# Patient Record
Sex: MALE | Race: BLACK OR AFRICAN AMERICAN | Employment: FULL TIME | URBAN - METROPOLITAN AREA
[De-identification: names, ages, dates, MRNs, and addresses within clinical notes are randomized per-mention and may not be internally consistent; named-entity substitution may affect disease eponyms.]

---

## 2017-01-17 ENCOUNTER — HOSPITAL ENCOUNTER (OUTPATIENT)
Dept: PHYSICAL THERAPY | Age: 57
Discharge: HOME | End: 2017-01-31

## 2017-01-17 ENCOUNTER — HOSPITAL ENCOUNTER (OUTPATIENT)
Dept: OTHER | Age: 57
Discharge: HOME | End: 2017-01-17
Attending: ORTHOPAEDIC SURGERY | Admitting: ORTHOPAEDIC SURGERY

## 2017-01-19 ENCOUNTER — HOSPITAL ENCOUNTER (OUTPATIENT)
Dept: PHYSICAL THERAPY | Age: 57
Discharge: HOME | End: 2017-01-19

## 2017-01-24 ENCOUNTER — HOSPITAL ENCOUNTER (OUTPATIENT)
Dept: PHYSICAL THERAPY | Age: 57
Discharge: HOME | End: 2017-01-24

## 2017-01-26 ENCOUNTER — HOSPITAL ENCOUNTER (OUTPATIENT)
Dept: PHYSICAL THERAPY | Age: 57
Discharge: HOME | End: 2017-01-26

## 2017-01-31 ENCOUNTER — HOSPITAL ENCOUNTER (OUTPATIENT)
Dept: PHYSICAL THERAPY | Age: 57
Discharge: HOME | End: 2017-01-31

## 2017-02-02 ENCOUNTER — HOSPITAL ENCOUNTER (OUTPATIENT)
Dept: PHYSICAL THERAPY | Age: 57
Discharge: HOME | End: 2017-02-02

## 2017-02-09 ENCOUNTER — HOSPITAL ENCOUNTER (OUTPATIENT)
Dept: PHYSICAL THERAPY | Age: 57
Discharge: HOME | End: 2017-02-09

## 2018-03-31 PROBLEM — K62.5 RECTAL BLEED: Status: ACTIVE | Noted: 2018-03-31

## 2020-03-17 ENCOUNTER — HOSPITAL ENCOUNTER (OUTPATIENT)
Dept: PHYSICAL THERAPY | Age: 60
Setting detail: THERAPIES SERIES
Discharge: HOME | End: 2020-03-17
Payer: COMMERCIAL

## 2020-03-17 PROCEDURE — 97161 PT EVAL LOW COMPLEX 20 MIN: CPT | Performed by: CHIROPRACTOR

## 2020-03-17 PROCEDURE — 97530 THERAPEUTIC ACTIVITIES: CPT | Performed by: CHIROPRACTOR

## 2020-03-17 PROCEDURE — 97113 AQUATIC THERAPY/EXERCISES: CPT

## 2020-03-17 ASSESSMENT — PAIN SCALES - QUEBEC BACK PAIN DISABILITY SCALE
TURN OVER IN BED: 1
RUN ONE BLOCK OR 100M: 2
GET OUT OF BED: 1
BEND OVER TO CLEAN THE BATHTUB: 2
PUT ON SOCKS OR PANYHOSE: 1
RIDE IN A CAR: 0
CLIMB ONE FLIGHT OF STAIRS: 0
LIFT AND CARRY A HEAVY SUITCASE: 2
MAKE YOUR BED: 1
PULL OR PUSH HEAVY DOORS: 1
SIT IN A CHAIR FOR SEVERAL HOURS: 1
SLEEP THROUGH THE NIGHT: 1
TAKE FOOD OUT OF THE REFRIGERATOR: 1
REACH UP TO HIGH SHELVES: 1
MOVE A CHAIR: 1
QUEBEC CMS MODIFIER: CI
WALK A FEW BLOCKS OR 300 TO 400M: 1
THROW A BALL: 1
QUEBEC DISABILITY INDEX: 1-19%
WALK SEVERAL KILOMETERS  OR MILES: 0
CARRY TWO BAGS OF GROCERIES: 1
TOTAL SCORE: 19
STAND UP FOR 20 TO 30 MINUTES: 0

## 2020-03-17 NOTE — FLOWSHEET NOTE
1: Decrease pain 50-75%  Long term goal 2: Resume usual ADLs     Patient Requires Follow-up: [x] Yes  [] No    Plan:   [x] Continue per plan of care [] Alter current plan (see comments)  [] Plan of care initiated [] Hold pending MD visit [] Discharge    Plan for Next Session:  [address]    Electronically signed by:  Altagracia Castro [id number]

## 2020-03-17 NOTE — FLOWSHEET NOTE
Physical Therapy Aquatic Flow Sheet   Date:  3/17/2020    Patient Name:  Karin Bonilla    :  1960    Restrictions/Precautions:  Fall Risk(No fall risk)  Pertinent Medical History: HTN, TKA    Diagnosis:  S/P lumbar fusion  Treatment Diagnosis:      Insurance/Certification information: Aetna Open Access  Referring Physician: Dr. Hadley Joshi of care signed (Y/N):      Visit# / total visits:    Pain level: 2/10      Progress Note: []  Yes  [x]  No      History of Injury:Lumbar fusion 20    Subjective:  No new c/o. Has been in the pool in the past.    Objective:   Observation:  See eval   Test measurements:      Key  B= Belt DB= Dumbells T= Theratube   G=Gloves N= Noodles W= Weights   P= Paddles WW=Water Walker K= Kickboard        Transfers:   steps with bilat handrails      % Immersion: 75%            Ambulation:   Exercises UE:      Forwards 3+1 Shoulder Shrugs     Lateral  Shoulder circles     Marching    Scapular Retraction  10    Retro   Rolling      Cariocas  Push Downs 10     IR/ER      Punching    Stretching: bilat Rowing 10   Gastroc/Soleus  20 sec x 3  Elbow Flex/Ext 10   Hamstring  20 sec x 3 Shldr Flex/Ext     Knee flex stretch   Shldr Abd/Add    Piriformis   Horiz Abd/Add     Hip flexor    Arm Circles  10   SKTC   PNF Diagonals    DKTC  UE oscillations f/b, s/s    ITB   Wall Push-ups    Quad  Figure 8's    Mid back   Buoy pull/push downs    UT  Tband rows    Rhom  Tband lats    Post Shoulder  Lumbar Rot w/ paddles    Pec   Small ball pull downs                   diagonals             Cervical:       AROM Flex       AROM Extension     LEs exercises:  bilat AROM Side Bending    Marching  10 AROM Rotation    Heel Raises  10 Chin tucks    Toe Raises  10 Chin nods     Squats  10      Hamstring Curls  10      Hip Flexion  10 Balance:      Hip Abduction 10 SLS    Hip Circles  Tandem stance    TA set  NBOS eyes open    Glut Set  NBOS eyes closed    Hip Extension  Hand to Opposite Knee Hip Adduction    Box Step     Hip IR   Noodle Stance     Hip ER  Stop/Go Gait    Fig 8's  Switch Gait                Seated: bilat Functional:    Ankle Pumps  Step up forward    Ankle circles  Step up lateral    Knee flex & ext  Step down    Hip Abd & Adduction  [place] squats    Bicycle   Crate Lifts    Add Set with ball  Lunges forward    LX stab with med ball throws  Lunges lateral    Ankle INV  Lunges retro    Ankle EV  Lower ab curl with noodle      Upper ab curl with ball      Med ball straight lifts      Med ball diagonal lifts      Hydrorider          Noodle:      Leg Press  Deep Water:    Noodle hang at wall  Jog    Noodle hang deep water  Jumping Jacks    Noodle Bicycle  Heel to toe      Hand to opposite knee      Cross country skier      Rocking Horse    Verbal cues for proper posture, exercise technique and exercise without increase pain. Charges:  Individual Aquatic Therapy:  [x] (05940) Provided verbal and tactile cueing for strengthening, flexibility, ROM using the therapeutic properties of water (buoyancy, resistance) for improvements in core control, mobility and ambulation     Aquatic Group:  [] (23772) Provided intermittent verbal and tactile cueing for strengthening, endurance, flexibility and ROM for 2-4 individuals that do not require one-on one patient contact by the therapist     Individual Aquatic Minutes: 30   Aquatic Group Minutes:      [x] Aquatic (01823) x 2  [] Aquatic Group (07938) x    Treatment/Activity Tolerance:  [x] Patient tolerated treatment well [] Patient limited by fatique  [] Patient limited by pain  [] Patient limited by other medical complications  [] Other:     Pain after aquatics: 2/10  Patient Education: Discussed with pt importance of exercises without increase pain and with proper posture. Pt verbalized understanding.     Plan:   [x] Continue per plan of care [] Alter current plan (see comments)  [] Plan of care initiated [] Hold pending MD visit []

## 2020-03-17 NOTE — PROGRESS NOTES
Physical Therapy  Initial Assessment  Date: 3/17/2020  Patient Name: Jeremi Lowry  MRN: [id number]  : 1960          Restrictions  Restrictions/Precautions  Restrictions/Precautions: Fall Risk(No fall risk)    Subjective   General  Chart Reviewed: Yes  Patient assessed for rehabilitation services?: Yes  Additional Pertinent Hx: HTN, TKA  Family / Caregiver Present: No  Referring Practitioner: Dr. Laura Bergeron  Referral Date : 20  Diagnosis: S/P lumbar fusion  PT Visit Information  Onset Date: (Surgery 20)  PT Insurance Information: Aetna Open Access  Total # of Visits Approved: 12  Total # of Visits to Date: 1  Subjective  Subjective: S/p Lumbar fusion  Pain Screening  Patient Currently in Pain: (3/10 at rest, up to 7/10 with activity)    Objective     Observation/Palpation  Posture: Good  Body Mechanics: aware of proper body mechanics    Spine  Lumbar: Lumbar mobility not tested    Strength RLE  Strength RLE: WNL  Strength LLE  Strength LLE: WNL  Strength Other  Other: Trunk strength at least 3/5     Additional Measures  Flexibility: HS flexibility: 65 degrees B  Sensation  Overall Sensation Status: WFL             Ambulation  Ambulation?: (Amb well without any assistive device)                            Assessment   Conditions Requiring Skilled Therapeutic Intervention  Body structures, Functions, Activity limitations: Increased pain;Decreased ROM  Assessment: Prior level of function: Independent with all usual ADLs  Prognosis: Good  Decision Making: Low Complexity  REQUIRES PT FOLLOW UP: Yes  Activity Tolerance  Activity Tolerance: Patient Tolerated treatment well         Plan   Plan  Times per week: 2x/wk x 6 weeks  Current Treatment Recommendations: Strengthening, Home Exercise Program, Aquatics         OutComes Score  [place] Total Score: 19 (20 1255)                                                        Goals  Short term goals  Time Frame for Short term goals: 3 weeks  Short term goal 1:

## 2020-03-19 ENCOUNTER — HOSPITAL ENCOUNTER (OUTPATIENT)
Dept: PHYSICAL THERAPY | Age: 60
Setting detail: THERAPIES SERIES
Discharge: HOME | End: 2020-03-19
Payer: COMMERCIAL

## 2020-03-19 PROCEDURE — 97113 AQUATIC THERAPY/EXERCISES: CPT

## 2020-03-19 PROCEDURE — 97150 GROUP THERAPEUTIC PROCEDURES: CPT

## 2020-03-19 NOTE — FLOWSHEET NOTE
Physical Therapy Aquatic Flow Sheet   Date:  3/19/2020    Patient Name:  Eben Patricia    :  1960    Restrictions/Precautions:  Fall Risk(No fall risk)  Pertinent Medical History: HTN, TKA    Diagnosis:  S/P lumbar fusion  Treatment Diagnosis:      Insurance/Certification information: Aetna Open Access  Referring Physician: Dr. Prashant Boo of care signed (Y/N):      Visit# / total visits:    Pain level: 2/10      Progress Note: []  Yes  [x]  No      History of Injury:Lumbar fusion 20    Subjective:  Felt pretty food after initial session. Objective:   Observation:  See eval   Test measurements:      Key  B= Belt DB= Dumbells T= Theratube   G=Gloves N= Noodles W= Weights   P= Paddles WW=Water Walker K= Kickboard        Transfers:   steps with bilat handrails      % Immersion: 75%            Ambulation:   Exercises UE:      Forwards  4 Shoulder Shrugs     Lateral  Shoulder circles  10    Marching    Scapular Retraction  10    Retro   Rolling  10    Cariocas  Push Downs  10     IR/ER  10     Punching  10   Stretching: bilat Rowing  10   Gastroc/Soleus  20 sec x 3  Elbow Flex/Ext  10   Hamstring  20 sec x 3 Shldr Flex/Ext  10   Knee flex stretch   Shldr Abd/Add  10   Piriformis   Horiz Abd/Add  10   Hip flexor    Arm Circles  10   SKTC   PNF Diagonals  10   DKTC  UE oscillations f/b, s/s    ITB   Wall Push-ups    Quad  Figure 8's    Mid back   Buoy pull/push downs    UT  Tband rows    Rhom  Tband lats    Post Shoulder  Lumbar Rot w/ paddles    Pec   Small ball pull downs                   diagonals             Cervical:       AROM Flex       AROM Extension     LEs exercises:  bilat AROM Side Bending    Marching  10 AROM Rotation    Heel Raises  10 Chin tucks    Toe Raises  10 Chin nods     Squats  10      Hamstring Curls  10      Hip Flexion  10 Balance:      Hip Abduction  10 SLS    Hip Circles  10 Tandem stance    TA set  NBOS eyes open    Glut Set  10 NBOS eyes closed    Hip Extension initiated [] Hold pending MD visit [] Discharge    Plan for Next Session:  Gradually increase gait and exercise with an emphasis on posture, proper exercise tech, and no increase pain. Therapist will educate patient on lumbopelvic stabilization with exercise and ADL's. Add:  With emphasis on stabilization, good posture and exercise technique, gradually progress spinal stabilization exercises to increase strength, flexibility and endurance and to decrease pain and improve function.   Add: lateral gait    Electronically signed by: Teresa Plata, PTA [id number]

## 2020-03-23 NOTE — DISCHARGE SUMMARY
Physical Therapy Discharge Note  Date: 3/23/2020    Patient Name: Rubin Arevalo  : 1960  MRN: [id number]    Diagnosis:  S/P Lumbar fusion    Referring Physician: Dr. Evelyne Butt    Dates of Service:   3/17/20 - 3/19/20  Total # of Visits:   2  Cancel/No Shows to date:        Treatment to Date:  [] Therapeutic Exercise  [] Modalities:  [x] Therapeutic Activity     [] Ultrasound  [] Electrical Stim   [] Gait Training      [] Cervical Traction    [] Lumbar Traction  [] Neuromuscular Re-education  [] Cold/hotpack [] Iontophoresis  [x] Instruction in HEP      Other:  [] Manual Therapy       []    [x] Aquatic Therapy       []                            Significant Findings At Last Visit:    Subjective: Pain 2/10                       Did not get final measurements because clinic was shut down                Overall Response to Treatment:  [] Patient responded well to treatment and improvement is noted with regards to functional goals              [x]Patient should continue to improve in reasonable time if they continue HEP              []Patient has plateaued and is no longer responding to skilled PT intervention                        []Patient is getting worse and would benefit from return to referring MD              []Patient unable to adhere to initial POC              []Other:     Goals:    Goal Status:  [] Achieved [] Partially Achieved  [x] Not Achieved     Reason for Discharge:  [] Goals Met   [] Patient did not return after initial evaluation   [] Progress Plateaued [] Missed _____ scheduled appointments   [] No insurance coverage [] Patient terminated therapy   [x] Other:  DC due to facility closing for C-19 precautions       PT recommendation:   [x] Patient now discharged with HEP      [] Other:    Discharge discussed with:  [x] Patient  [] Caregiver       [] Other        Electronically signed by:  Leann Julian    If you have any questions or concerns, please don't hesitate to call.   Thank you for your referral.

## 2020-03-24 ENCOUNTER — APPOINTMENT (OUTPATIENT)
Dept: PHYSICAL THERAPY | Age: 60
End: 2020-03-24
Payer: COMMERCIAL

## 2020-03-26 ENCOUNTER — APPOINTMENT (OUTPATIENT)
Dept: PHYSICAL THERAPY | Age: 60
End: 2020-03-26
Payer: COMMERCIAL

## 2020-03-31 ENCOUNTER — APPOINTMENT (OUTPATIENT)
Dept: PHYSICAL THERAPY | Age: 60
End: 2020-03-31
Payer: COMMERCIAL

## 2023-01-04 ENCOUNTER — HOSPITAL ENCOUNTER (OUTPATIENT)
Dept: CT IMAGING | Age: 63
Discharge: HOME | End: 2023-01-04
Payer: COMMERCIAL

## 2023-01-04 DIAGNOSIS — N20.1 CALCULUS OF URETER: ICD-10-CM

## 2023-01-04 PROCEDURE — 74176 CT ABD & PELVIS W/O CONTRAST: CPT

## 2023-01-11 ENCOUNTER — APPOINTMENT (OUTPATIENT)
Dept: CT IMAGING | Age: 63
End: 2023-01-11
Payer: COMMERCIAL

## 2023-01-11 ENCOUNTER — HOSPITAL ENCOUNTER (EMERGENCY)
Age: 63
Discharge: HOME | End: 2023-01-11
Payer: COMMERCIAL

## 2023-01-11 VITALS
WEIGHT: 213.63 LBS | DIASTOLIC BLOOD PRESSURE: 92 MMHG | RESPIRATION RATE: 14 BRPM | HEIGHT: 70 IN | TEMPERATURE: 97.4 F | SYSTOLIC BLOOD PRESSURE: 148 MMHG | OXYGEN SATURATION: 100 % | HEART RATE: 70 BPM | BODY MASS INDEX: 30.58 KG/M2

## 2023-01-11 DIAGNOSIS — M51.36 DDD (DEGENERATIVE DISC DISEASE), LUMBAR: ICD-10-CM

## 2023-01-11 DIAGNOSIS — M54.50 LEFT-SIDED LOW BACK PAIN WITHOUT SCIATICA, UNSPECIFIED CHRONICITY: Primary | ICD-10-CM

## 2023-01-11 DIAGNOSIS — R10.9 LEFT FLANK PAIN: ICD-10-CM

## 2023-01-11 DIAGNOSIS — R91.1 PULMONARY NODULE: ICD-10-CM

## 2023-01-11 LAB
A/G RATIO: 1.6 (ref 1.1–2.2)
ALBUMIN SERPL-MCNC: 4.2 G/DL (ref 3.4–5)
ALP BLD-CCNC: 106 U/L (ref 40–129)
ALT SERPL-CCNC: 15 U/L (ref 10–40)
ANION GAP SERPL CALCULATED.3IONS-SCNC: 7 MMOL/L (ref 3–16)
AST SERPL-CCNC: 15 U/L (ref 15–37)
BASOPHILS ABSOLUTE: 0 K/UL (ref 0–0.2)
BASOPHILS RELATIVE PERCENT: 0.7 %
BILIRUB SERPL-MCNC: <0.2 MG/DL (ref 0–1)
BILIRUBIN URINE: NEGATIVE
BLOOD, URINE: NEGATIVE
BUN BLDV-MCNC: 15 MG/DL (ref 7–20)
CALCIUM SERPL-MCNC: 9.3 MG/DL (ref 8.3–10.6)
CHLORIDE BLD-SCNC: 104 MMOL/L (ref 99–110)
CLARITY: CLEAR
CO2: 27 MMOL/L (ref 21–32)
COLOR: YELLOW
CREAT SERPL-MCNC: 0.8 MG/DL (ref 0.8–1.3)
EOSINOPHILS ABSOLUTE: 0.2 K/UL (ref 0–0.6)
EOSINOPHILS RELATIVE PERCENT: 4.7 %
GFR SERPL CREATININE-BSD FRML MDRD: >60 ML/MIN/{1.73_M2}
GLUCOSE BLD-MCNC: 97 MG/DL (ref 70–99)
GLUCOSE URINE: NEGATIVE MG/DL
HCT VFR BLD CALC: 41.7 % (ref 40.5–52.5)
HEMOGLOBIN: 13.7 G/DL (ref 13.5–17.5)
KETONES, URINE: NEGATIVE MG/DL
LEUKOCYTE ESTERASE, URINE: NEGATIVE
LYMPHOCYTES ABSOLUTE: 1.5 K/UL (ref 1–5.1)
LYMPHOCYTES RELATIVE PERCENT: 37.9 %
MCH RBC QN AUTO: 29.8 PG (ref 26–34)
MCHC RBC AUTO-ENTMCNC: 32.9 G/DL (ref 31–36)
MCV RBC AUTO: 90.5 FL (ref 80–100)
MICROSCOPIC EXAMINATION: NORMAL
MONOCYTES ABSOLUTE: 0.6 K/UL (ref 0–1.3)
MONOCYTES RELATIVE PERCENT: 16 %
NEUTROPHILS ABSOLUTE: 1.6 K/UL (ref 1.7–7.7)
NEUTROPHILS RELATIVE PERCENT: 40.7 %
NITRITE, URINE: NEGATIVE
PDW BLD-RTO: 13.8 % (ref 12.4–15.4)
PH UA: 7.5 (ref 5–8)
PLATELET # BLD: 292 K/UL (ref 135–450)
PMV BLD AUTO: 7.2 FL (ref 5–10.5)
POTASSIUM REFLEX MAGNESIUM: 4.3 MMOL/L (ref 3.5–5.1)
PROTEIN UA: NEGATIVE MG/DL
RBC # BLD: 4.61 M/UL (ref 4.2–5.9)
SODIUM BLD-SCNC: 138 MMOL/L (ref 136–145)
SPECIFIC GRAVITY UA: 1.03 (ref 1–1.03)
TOTAL PROTEIN: 6.9 G/DL (ref 6.4–8.2)
URINE REFLEX TO CULTURE: NORMAL
URINE TYPE: NORMAL
UROBILINOGEN, URINE: 1 E.U./DL
WBC # BLD: 3.9 K/UL (ref 4–11)

## 2023-01-11 PROCEDURE — 99284 EMERGENCY DEPT VISIT MOD MDM: CPT

## 2023-01-11 PROCEDURE — 74176 CT ABD & PELVIS W/O CONTRAST: CPT

## 2023-01-11 PROCEDURE — 80053 COMPREHEN METABOLIC PANEL: CPT

## 2023-01-11 PROCEDURE — 81003 URINALYSIS AUTO W/O SCOPE: CPT

## 2023-01-11 PROCEDURE — 6360000002 HC RX W HCPCS: Performed by: NURSE PRACTITIONER

## 2023-01-11 PROCEDURE — 96375 TX/PRO/DX INJ NEW DRUG ADDON: CPT

## 2023-01-11 PROCEDURE — 96374 THER/PROPH/DIAG INJ IV PUSH: CPT

## 2023-01-11 PROCEDURE — 2580000003 HC RX 258: Performed by: NURSE PRACTITIONER

## 2023-01-11 PROCEDURE — 85025 COMPLETE CBC W/AUTO DIFF WBC: CPT

## 2023-01-11 RX ORDER — 0.9 % SODIUM CHLORIDE 0.9 %
1000 INTRAVENOUS SOLUTION INTRAVENOUS ONCE
Status: COMPLETED | OUTPATIENT
Start: 2023-01-11 | End: 2023-01-11

## 2023-01-11 RX ORDER — KETOROLAC TROMETHAMINE 30 MG/ML
15 INJECTION, SOLUTION INTRAMUSCULAR; INTRAVENOUS ONCE
Status: COMPLETED | OUTPATIENT
Start: 2023-01-11 | End: 2023-01-11

## 2023-01-11 RX ORDER — TRAMADOL HYDROCHLORIDE 50 MG/1
50 TABLET ORAL EVERY 6 HOURS PRN
Qty: 12 TABLET | Refills: 0 | Status: SHIPPED | OUTPATIENT
Start: 2023-01-11 | End: 2023-01-14

## 2023-01-11 RX ORDER — METHOCARBAMOL 500 MG/1
500 TABLET, FILM COATED ORAL 3 TIMES DAILY
Qty: 30 TABLET | Refills: 0 | Status: SHIPPED | OUTPATIENT
Start: 2023-01-11 | End: 2023-01-21

## 2023-01-11 RX ORDER — PREDNISONE 20 MG/1
TABLET ORAL
Qty: 18 TABLET | Refills: 0 | Status: SHIPPED | OUTPATIENT
Start: 2023-01-11 | End: 2023-01-21

## 2023-01-11 RX ADMIN — HYDROMORPHONE HYDROCHLORIDE 1 MG: 1 INJECTION, SOLUTION INTRAMUSCULAR; INTRAVENOUS; SUBCUTANEOUS at 18:40

## 2023-01-11 RX ADMIN — SODIUM CHLORIDE 1000 ML: 9 INJECTION, SOLUTION INTRAVENOUS at 18:25

## 2023-01-11 RX ADMIN — KETOROLAC TROMETHAMINE 15 MG: 30 INJECTION, SOLUTION INTRAMUSCULAR at 18:41

## 2023-01-11 ASSESSMENT — ENCOUNTER SYMPTOMS
BACK PAIN: 1
NAUSEA: 1
ABDOMINAL PAIN: 1
VOMITING: 0
RESPIRATORY NEGATIVE: 1

## 2023-01-11 ASSESSMENT — PAIN DESCRIPTION - ORIENTATION: ORIENTATION: LEFT

## 2023-01-11 ASSESSMENT — PAIN SCALES - GENERAL
PAINLEVEL_OUTOF10: 9
PAINLEVEL_OUTOF10: 9

## 2023-01-11 ASSESSMENT — PAIN DESCRIPTION - LOCATION: LOCATION: FLANK

## 2023-01-11 ASSESSMENT — PAIN - FUNCTIONAL ASSESSMENT: PAIN_FUNCTIONAL_ASSESSMENT: 0-10

## 2023-01-11 ASSESSMENT — PAIN DESCRIPTION - DESCRIPTORS: DESCRIPTORS: SHARP;STABBING;THROBBING

## 2023-01-11 NOTE — ED NOTES
Patient just arrived to ED 9     Report from Seun Mccall 82 states Labs and urine sent just needs medications     Sindi Young, RN  01/11/23 2507

## 2023-01-11 NOTE — ED PROVIDER NOTES
[address]  [address] [address]  Dept: [phone]  Loc: [phone]  eMERGENCYdEPARTMENT eNCOUnter      Pt Name: Angy Sharma  MRN: [id number]  Mikhailgflara 1960  Date of evaluation: 1/11/2023  Provider:ELIZABETH Arteaga - [facility]      Independently evaluated by the advanced practice provider    [address] [place] [address]       Chief Complaint   Patient presents with    Abdominal Pain     Pt reports LLQ abd pain and L flank pain since 01/01. Pt reports that he went to urgent care and given flomax. Pt has hx of kidney stones and feels like this. CRITICAL CARE TIME       HISTORY OF PRESENT ILLNESS  (Location/Symptom, Timing/Onset, Context/Setting, Quality, Duration,Modifying Factors, Severity.)   Angy Sharma is a 58 y.o. male who presents to the emergency department PMHx: Kidney stones, HTN  Surgical history includes appendectomy, lumbar fusion, left testicle removal    Lives at home with male significant other  Employed  Anticoagulation therapy: None  CODE STATUS: Full    HPI provided by patient    Patient presents to the emergency department complaining of a 2-week history of waxing and waning left lower quadrant abdominal pain, left flank and left back pain associated with hematuria. Started 2 weeks ago. Was seen at an urgent care and was given Flomax and antibiotics and was told he had a kidney stone. He also seen his primary care who obtained an outpatient CT abdomen and pelvis study which essentially was negative. .  The pain did improve and go away for a day or 2 but has come back since then and is extremely intense. Positive nausea and chills. Denies trauma. Denies testicular pain. Denies penile discharge. Pain level currently 10/10. Nothing makes it better, made worse with movement. Denies any kind of testicular pain or swelling.         Nursing Notes were reviewedand agreed with or any disagreements were addressed in the HPI.    REVIEW OF SYSTEMS    (2-9 systems for level 4, 10 or more for level 5)     Review of Systems   Constitutional:  Positive for activity change, appetite change and chills.   HENT: Negative.     Respiratory: Negative.     Cardiovascular: Negative.    Gastrointestinal:  Positive for abdominal pain and nausea. Negative for vomiting.   Genitourinary:  Positive for flank pain and hematuria.   Musculoskeletal:  Positive for back pain.   Neurological: Negative.    Hematological: Negative.      Except as noted above the remainder of the review of systems was reviewed and negative.       PAST MEDICAL HISTORY         Diagnosis Date    Hypertension     Kidney stone        SURGICAL HISTORY           Procedure Laterality Date    BACK SURGERY      CARPAL TUNNEL RELEASE  03/2018    ENDOSCOPY, COLON, DIAGNOSTIC  04/02/2018    JOINT REPLACEMENT      ROTATOR CUFF REPAIR Right        CURRENT MEDICATIONS     [unfilled]    ALLERGIES     Aspirin and Ketorolac    FAMILY HISTORY     History reviewed. No pertinent family history.  No family status information on file.        SOCIAL HISTORY      reports that he has been smoking cigarettes. He has never used smokeless tobacco. He reports current alcohol use. He reports that he does not currently use drugs after having used the following drugs: Marijuana (Weed).    PHYSICAL EXAM    (up to 7 for level 4, 8 or more for level 5)     ED Triage Vitals [01/11/23 1730]   Enc Vitals Group      BP (!) 151/86      Heart Rate 73      Resp 18      Temp 97.4 °F (36.3 °C)      Temp Source Oral      SpO2 97 %      Weight 213 lb 10 oz (96.9 kg)      Height 5' 10\" (1.778 m)      Head Circumference       Peak Flow       Pain Score       Pain Loc       Pain Edu?       Excl. in GC?         Physical Exam  Vitals and nursing note reviewed.   Constitutional:       Appearance: He is not toxic-appearing or diaphoretic.      Comments: Does not appear toxic but does appear uncomfortable and  distressed due to pain. No diaphoresis. HENT:      Head: Normocephalic. Cardiovascular:      Rate and Rhythm: Normal rate and regular rhythm. Pulmonary:      Effort: Pulmonary effort is normal.      Breath sounds: Normal breath sounds. Abdominal:      General: Abdomen is protuberant. Bowel sounds are normal. There is abdominal bruit. There are no signs of injury. Palpations: Abdomen is soft. Tenderness: There is abdominal tenderness in the left lower quadrant. There is left CVA tenderness. There is no guarding or rebound. Negative signs include Maurice's sign, Rovsing's sign and McBurney's sign. Skin:     General: Skin is warm and dry. Capillary Refill: Capillary refill takes less than 2 seconds. Neurological:      General: No focal deficit present. Mental Status: He is alert. DIAGNOSTIC RESULTS     EKG: All EKG's are interpreted by the Emergency Department Physician who either signs or Co-signs this chart in the absence of a cardiologist.    RADIOLOGY:   Non-plain film images such as CT, Ultrasound and MRI are read by the radiologist. Plain radiographic images are visualized and preliminarilyinterpreted by the emergency physician with the below findings:    Interpretation per the Radiologist below,if available at the time of this note:    CT ABDOMEN PELVIS WO CONTRAST Additional Contrast? None   Final Result   1. No acute findings in the abdomen or pelvis. No evidence of urinary tract   obstruction. 2.  Colonic diverticulosis without evidence of diverticulitis. 3.  Status post posterior spinal fusion without evidence of complication. There is however multilevel degenerative intervertebral facet arthropathy   within the lumbar spine.                LABS:  Labs Reviewed   CBC WITH AUTO DIFFERENTIAL - Abnormal; Notable for the following components:       Result Value    WBC 3.9 (*)     Neutrophils Absolute 1.6 (*)     All other components within normal limits COMPREHENSIVE METABOLIC PANEL W/ REFLEX TO MG FOR LOW K   URINALYSIS WITH REFLEX TO CULTURE       All other labs were within normal range or not returned as of this dictation. EMERGENCY DEPARTMENT COURSE and DIFFERENTIAL DIAGNOSIS/MDM:   Vitals:    Vitals:    01/11/23 1819 01/11/23 1834 01/11/23 2004 01/11/23 2007   BP: (!) 144/89 (!) 145/85 (!) 148/92    Pulse: 74 74 71 70   Resp: 22 25 22 14   Temp:       TempSrc:       SpO2: 100% 96% 99% 100%   Weight:       Height:         Medications   0.9 % sodium chloride bolus (0 mLs IntraVENous Stopped 1/11/23 2007)   HYDROmorphone (DILAUDID) injection 1 mg (1 mg IntraVENous Given 1/11/23 1840)   ketorolac (TORADOL) injection 15 mg (15 mg IntraVENous Given 1/11/23 1841)       MDM    Patient was seen and evaluated per myself. Dr. Chris Maxwell present available for consultation as needed. Differential diagnosis: Ureteral obstruction, hydroureter, pyelonephritis, diverticulitis, exacerbation of of back pain-> escalation of DDD or herniated disc  Less likely appendicitis due to the fact prior appendectomy    Vital signs: Afebrile 97.4, no tachycardia. No evidence of respiratory distress. Blood pressure mildly elevated 151/86. Patient does take antihypertensives. This is not consistent with hypertensive emergency or urgency. Plan: CT abdomen and pelvis without, urinalysis, basic laboratory studies, IV fluids, Dilaudid IV push and Toradol IV push    Bedside reevaluation after pain medication given: No left lower quadrant pain, no flank tenderness to palpation or percussion. There was some pain to the left paraspinous and in the SI region. No evidence of swelling. Patient's vital signs are stable. He self endorses improvement of pain and discomfort. Metabolic panel unremarkable. No electrolyte derangement. No acidosis. Renal function is unremarkable. CBC: Mild leukopenia 3.9. Absolute neutrophils low 1.6. Otherwise unremarkable.   CBC in 2018 indicates borderline low normal WBC of 4.3. Absolute neutrophils were 2.6. CT abdomen and pelvis negative for obstruction. No bowel abnormality other than mild diverticulosis. No evidence of diverticulitis or abscess. No free air or free fluid. He has bilateral inguinal hernias containing fat. No evidence of hydroureter or pyelonephritis. There is evidence of multilevel degenerative facet arthropathy with no evidence of hardware failure. SDM: All diagnostic study results were discussed with the patient and his partner at bedside. There is no evidence of UTI or hematuria. No evidence of ureteral obstruction. Vital signs are stable. No evidence of infection. At this point in time I feel that the patient can be discharged home with pain medication. I feel that he needs to refer back to his primary care physician for any further follow-up regarding an etiology related to his back. Patient naturally is instructed return to the emergency department for any return of symptoms or worsening of symptoms. He and his partner both were in agreement he is discharged home in good condition and ambulatory. Discharging prescriptions included prednisone, tramadol and Robaxin. CONSULTS:  None    PROCEDURES:  Procedures    FINAL IMPRESSION      1. Left-sided low back pain without sciatica, unspecified chronicity    2. Left flank pain    3. DDD (degenerative disc disease), lumbar    4.  Pulmonary nodule          DISPOSITION/PLAN   [unfilled]    PATIENT REFERRED TO:  Shae Nina MD  [address] [place] [address]  [phone]    Schedule an appointment as soon as possible for a visit       DISCHARGE MEDICATIONS:  Discharge Medication List as of 1/11/2023  8:08 PM        START taking these medications    Details   predniSONE (DELTASONE) 20 MG tablet 3 tabs po qam for 3 days then 2 tabs qam for 3 days the 1 tab qam for 3 days, Disp-18 tablet, R-0Print      traMADol (ULTRAM) 50 MG tablet Take 1 tablet by mouth every 6 hours as needed for Pain for up to 3 days. Intended supply: 3 days. Take lowest dose possible to manage pain Max Daily Amount: 200 mg, Disp-12 tablet, R-0Print      methocarbamol (ROBAXIN) 500 MG tablet Take 1 tablet by mouth 3 times daily for 10 days, Disp-30 tablet, R-0Print             (Please note that portions of this note were completed with a voice recognition program.  Efforts were made to edit the dictations but occasionally words are mis-transcribed.)    Halley Leonardo, [facility]IZABETH - [facility]      This dictation was performed with a verbal recognition program (DRAGON) and it was checked for errors. It is possible that there are still dictated errors within this office note. If so, please bring any errors to my attention for an addendum. All efforts were made to ensure that this office note is accurate.

## 2023-01-12 NOTE — DISCHARGE INSTRUCTIONS
Lung Nodules    Your x-ray or CT scan shows a nodule (\"spot\") on your lung. This will require follow-up for further evaluation. Please call your Primary Care Physician (PCP) if you have already established one and you confirmed your PCP during your ER visit today. If you do not have a PCP, please call the [address] [place] [address] scheduling number to schedule your Emergency Department follow up visit. Please mention that you are scheduling an \"ER follow up visit\" for a lung nodule. Learning About Lung Nodules  What is a lung nodule? A lung nodule is a growth in the lung. A single nodule surrounded by lung tissue is called a solitary pulmonary nodule. A lung nodule might not cause any symptoms. Your doctor may have found one or more nodules on your lung when you were having a chest X-ray or CT scan. Or it may have been found during a lung cancer screening. A lung nodule may be caused by an old infection or cancer. It might also be a noncancerous growth. Lung nodules can cause a screening to give an abnormal result. Most nodules do not cause any harm. But without further tests, your doctor can't tell whether an abnormal finding is cancer, a harmless nodule, or something else. What can you expect when you have a lung nodule? Your doctor will look at several risk factors to see how likely it is that the nodule is cancer. He or she will look at: Whether you smoke or have ever smoked. Your age and your family's medical history. Whether you have ever had lung cancer. The size and shape of the nodule. Whether the nodule has changed in size. Your doctor may look at past chest X-rays or CT scans, if available, and compare them. Or you may have a series of CT scans to see if the nodule grows over time. What happens next depends on the risk of the nodule being cancer. If you have no risk factors and the nodule is small, your doctor may advise doing nothing.   If the risk is small, your doctor may schedule follow-up appointments and tests. You may have more CT scans later to see if the nodule is growing. If the nodule hasn't changed in 3 to 6 months, you may have CT scans every year. If it hasn't changed in 2 years, you may not need any more tests. If there's a higher risk of cancer, your doctor may:  Do a PET scan, which may help tell if the nodule is cancerous or not. Take a sample of tissue from the nodule for testing. This is called a biopsy. Remove the nodule with surgery. Follow-up care is a key part of your treatment and safety. Be sure to make and go to all appointments, and call your doctor if you are having problems. It's also a good idea to know your test results and keep a list of the medicines you take. What is the next step in evaluation of a lung nodule? Below are the recommended guidelines for management of pulmonary nodules, you can discuss these recommendations at your follow-up appointment:     Nodule size less than or equal to ?4 mm  In a low-risk patient, no follow-up needed. In a high-risk patient, follow-up CT at 12 months; if unchanged, no further follow-up. Nodule size equals >4-6 mm  In a low-risk patient, follow-up CT at 12 months; if unchanged, no further follow-up. In a high-risk patient, initial follow-up CT at 6-12 months then at 18-24 months if no change. Nodule size equals >6-8 mm  In a low-risk patient, initial follow-up CT at 6-12 months then at 18-24 months if no change. In a high-risk patient, initial follow-up CT at 3-6 months then at 9-12 months and 24 months if no change. Nodule size greater than >8 mm  In low-risk and high-risk patients follow-up CT at around 3, 9, and 24 months, contrast-enhanced CT, PET, and/or biopsy. You have been prescribed narcotic pain medication for the next 2-3 days. Do not exceed use beyond what has been prescribed. Do not drive or operate machinery while taking this medication.  This medication is only to be used for a short term, 2-3 days.  Do not take any other sedating substances or drink alcohol while using this medication.

## 2023-01-12 NOTE — ED NOTES
Shift report given to ADRIENNE Dean;  No other concerns or questions at this time     Ernestina Rodarte RN  01/11/23 [id number]